# Patient Record
Sex: MALE | Race: WHITE | NOT HISPANIC OR LATINO | URBAN - METROPOLITAN AREA
[De-identification: names, ages, dates, MRNs, and addresses within clinical notes are randomized per-mention and may not be internally consistent; named-entity substitution may affect disease eponyms.]

---

## 2017-01-29 ENCOUNTER — EMERGENCY (EMERGENCY)
Facility: HOSPITAL | Age: 72
LOS: 1 days | Discharge: HOME | End: 2017-01-29
Attending: EMERGENCY MEDICINE
Payer: MEDICARE

## 2017-01-29 VITALS
OXYGEN SATURATION: 98 % | HEART RATE: 49 BPM | SYSTOLIC BLOOD PRESSURE: 158 MMHG | TEMPERATURE: 97 F | RESPIRATION RATE: 18 BRPM | DIASTOLIC BLOOD PRESSURE: 80 MMHG

## 2017-01-29 VITALS
TEMPERATURE: 98 F | HEART RATE: 58 BPM | DIASTOLIC BLOOD PRESSURE: 66 MMHG | SYSTOLIC BLOOD PRESSURE: 126 MMHG | HEIGHT: 66 IN | OXYGEN SATURATION: 96 % | WEIGHT: 175.93 LBS | RESPIRATION RATE: 16 BRPM

## 2017-01-29 DIAGNOSIS — E78.00 PURE HYPERCHOLESTEROLEMIA, UNSPECIFIED: ICD-10-CM

## 2017-01-29 DIAGNOSIS — R51 HEADACHE: ICD-10-CM

## 2017-01-29 DIAGNOSIS — M25.512 PAIN IN LEFT SHOULDER: ICD-10-CM

## 2017-01-29 DIAGNOSIS — I10 ESSENTIAL (PRIMARY) HYPERTENSION: ICD-10-CM

## 2017-01-29 PROCEDURE — 99284 EMERGENCY DEPT VISIT MOD MDM: CPT

## 2017-01-29 PROCEDURE — 70450 CT HEAD/BRAIN W/O DYE: CPT

## 2017-01-29 PROCEDURE — 73030 X-RAY EXAM OF SHOULDER: CPT | Mod: 26,LT

## 2017-01-29 PROCEDURE — 99284 EMERGENCY DEPT VISIT MOD MDM: CPT | Mod: 25

## 2017-01-29 PROCEDURE — 96374 THER/PROPH/DIAG INJ IV PUSH: CPT

## 2017-01-29 PROCEDURE — 70450 CT HEAD/BRAIN W/O DYE: CPT | Mod: 26

## 2017-01-29 PROCEDURE — 73030 X-RAY EXAM OF SHOULDER: CPT

## 2017-01-29 RX ORDER — ACETAMINOPHEN 500 MG
975 TABLET ORAL ONCE
Qty: 0 | Refills: 0 | Status: COMPLETED | OUTPATIENT
Start: 2017-01-29 | End: 2017-01-29

## 2017-01-29 RX ORDER — METOCLOPRAMIDE HCL 10 MG
10 TABLET ORAL ONCE
Qty: 0 | Refills: 0 | Status: COMPLETED | OUTPATIENT
Start: 2017-01-29 | End: 2017-01-29

## 2017-01-29 RX ADMIN — Medication 10 MILLIGRAM(S): at 17:28

## 2017-01-29 RX ADMIN — Medication 975 MILLIGRAM(S): at 17:28

## 2017-01-29 NOTE — ED PROVIDER NOTE - MEDICAL DECISION MAKING DETAILS
72 y/o M pt p/w headache, L shoulder ecchymosis. Check CT Head, Xray L Shoulder. Give IV fluids, meds. Reassess. 72 y/o M pt p/w headache, L shoulder ecchymosis x 1 day. Check CT Head, Xray L Shoulder. Give IV fluids, reglan meds. Reassess.

## 2017-01-29 NOTE — ED PROVIDER NOTE - OBJECTIVE STATEMENT
72 y/o M pt w/ PMHx of HTN, HLD and Gout presents to the ED c/o headache x 2-3 days. Pt states that he checked his BP yesterday and found it to be 208 systolic. On exam, pt also reports sudden onset of bruising and pain to L shoulder yesterday. Pt notes that he had a mechanical fall x 4 months ago, but denies any new trauma or fall. Pt denies fever, chills, chest pain, SOB, cough, nausea, vomiting, back pain, neck pain, numbness, tingling, weakness, dizziness, visual changes, or any other complaints. NKDA

## 2017-01-29 NOTE — ED ADULT TRIAGE NOTE - CHIEF COMPLAINT QUOTE
headache, elevated blood pressure, ecchymosis and deformity to left shoulder with tenderness s/p fall x 4 months ago

## 2017-01-29 NOTE — ED PROVIDER NOTE - NS ED MD SCRIBE ATTENDING SCRIBE SECTIONS
PHYSICAL EXAM/VITAL SIGNS( Pullset)/DISPOSITION/PAST MEDICAL/SURGICAL/SOCIAL HISTORY/HISTORY OF PRESENT ILLNESS/REVIEW OF SYSTEMS

## 2017-01-29 NOTE — ED PROVIDER NOTE - CHPI ED SYMPTOMS NEG
no dizziness/no weakness/no fever/no vomiting/no chest pain, no shortness of breath, no cough/no loss of consciousness/no nausea/no numbness

## 2017-03-01 ENCOUNTER — APPOINTMENT (OUTPATIENT)
Dept: ORTHOPEDIC SURGERY | Facility: CLINIC | Age: 72
End: 2017-03-01

## 2017-03-01 VITALS
WEIGHT: 175 LBS | DIASTOLIC BLOOD PRESSURE: 78 MMHG | BODY MASS INDEX: 29.88 KG/M2 | SYSTOLIC BLOOD PRESSURE: 148 MMHG | HEART RATE: 72 BPM | HEIGHT: 64 IN

## 2017-03-01 DIAGNOSIS — Z86.79 PERSONAL HISTORY OF OTHER DISEASES OF THE CIRCULATORY SYSTEM: ICD-10-CM

## 2017-03-01 DIAGNOSIS — Z86.39 PERSONAL HISTORY OF OTHER ENDOCRINE, NUTRITIONAL AND METABOLIC DISEASE: ICD-10-CM

## 2017-03-01 DIAGNOSIS — Z87.39 PERSONAL HISTORY OF OTHER DISEASES OF THE MUSCULOSKELETAL SYSTEM AND CONNECTIVE TISSUE: ICD-10-CM

## 2017-03-01 RX ORDER — NABUMETONE 500 MG/1
500 TABLET, FILM COATED ORAL
Qty: 60 | Refills: 2 | Status: ACTIVE | COMMUNITY
Start: 2017-03-01 | End: 1900-01-01

## 2017-03-23 ENCOUNTER — APPOINTMENT (OUTPATIENT)
Dept: ORTHOPEDIC SURGERY | Facility: CLINIC | Age: 72
End: 2017-03-23

## 2017-03-23 DIAGNOSIS — M25.512 PAIN IN LEFT SHOULDER: ICD-10-CM

## 2017-03-23 DIAGNOSIS — G89.29 PAIN IN LEFT SHOULDER: ICD-10-CM

## 2017-07-10 ENCOUNTER — RX RENEWAL (OUTPATIENT)
Age: 72
End: 2017-07-10

## 2017-07-10 RX ORDER — TAMSULOSIN HYDROCHLORIDE 0.4 MG/1
0.4 CAPSULE ORAL
Qty: 90 | Refills: 2 | Status: ACTIVE | COMMUNITY
Start: 2017-07-10 | End: 1900-01-01

## 2017-10-13 ENCOUNTER — EMERGENCY (EMERGENCY)
Facility: HOSPITAL | Age: 72
LOS: 1 days | Discharge: HOME | End: 2017-10-13
Attending: EMERGENCY MEDICINE
Payer: MEDICARE

## 2017-10-13 VITALS
WEIGHT: 169.98 LBS | HEART RATE: 54 BPM | OXYGEN SATURATION: 100 % | HEIGHT: 66 IN | SYSTOLIC BLOOD PRESSURE: 139 MMHG | DIASTOLIC BLOOD PRESSURE: 66 MMHG | RESPIRATION RATE: 20 BRPM | TEMPERATURE: 98 F

## 2017-10-13 VITALS
TEMPERATURE: 98 F | SYSTOLIC BLOOD PRESSURE: 170 MMHG | RESPIRATION RATE: 18 BRPM | HEART RATE: 53 BPM | DIASTOLIC BLOOD PRESSURE: 77 MMHG | OXYGEN SATURATION: 100 %

## 2017-10-13 DIAGNOSIS — R10.9 UNSPECIFIED ABDOMINAL PAIN: ICD-10-CM

## 2017-10-13 LAB
ALBUMIN SERPL ELPH-MCNC: 3.7 G/DL — SIGNIFICANT CHANGE UP (ref 3.5–5)
ALP SERPL-CCNC: 74 U/L — SIGNIFICANT CHANGE UP (ref 40–120)
ALT FLD-CCNC: 34 U/L DA — SIGNIFICANT CHANGE UP (ref 10–60)
ANION GAP SERPL CALC-SCNC: 6 MMOL/L — SIGNIFICANT CHANGE UP (ref 5–17)
APPEARANCE UR: CLEAR — SIGNIFICANT CHANGE UP
APTT BLD: 32.5 SEC — SIGNIFICANT CHANGE UP (ref 27.5–37.4)
AST SERPL-CCNC: 32 U/L — SIGNIFICANT CHANGE UP (ref 10–40)
BASOPHILS # BLD AUTO: 0.1 K/UL — SIGNIFICANT CHANGE UP (ref 0–0.2)
BASOPHILS NFR BLD AUTO: 1.1 % — SIGNIFICANT CHANGE UP (ref 0–2)
BILIRUB SERPL-MCNC: 0.5 MG/DL — SIGNIFICANT CHANGE UP (ref 0.2–1.2)
BILIRUB UR-MCNC: NEGATIVE — SIGNIFICANT CHANGE UP
BUN SERPL-MCNC: 35 MG/DL — HIGH (ref 7–18)
CALCIUM SERPL-MCNC: 9.1 MG/DL — SIGNIFICANT CHANGE UP (ref 8.4–10.5)
CHLORIDE SERPL-SCNC: 107 MMOL/L — SIGNIFICANT CHANGE UP (ref 96–108)
CO2 SERPL-SCNC: 27 MMOL/L — SIGNIFICANT CHANGE UP (ref 22–31)
COLOR SPEC: YELLOW — SIGNIFICANT CHANGE UP
CREAT SERPL-MCNC: 1.49 MG/DL — HIGH (ref 0.5–1.3)
DIFF PNL FLD: NEGATIVE — SIGNIFICANT CHANGE UP
EOSINOPHIL # BLD AUTO: 0.1 K/UL — SIGNIFICANT CHANGE UP (ref 0–0.5)
EOSINOPHIL NFR BLD AUTO: 1.7 % — SIGNIFICANT CHANGE UP (ref 0–6)
GLUCOSE SERPL-MCNC: 119 MG/DL — HIGH (ref 70–99)
GLUCOSE UR QL: NEGATIVE — SIGNIFICANT CHANGE UP
HCT VFR BLD CALC: 39.1 % — SIGNIFICANT CHANGE UP (ref 39–50)
HGB BLD-MCNC: 13.7 G/DL — SIGNIFICANT CHANGE UP (ref 13–17)
INR BLD: 1.2 RATIO — HIGH (ref 0.88–1.16)
KETONES UR-MCNC: NEGATIVE — SIGNIFICANT CHANGE UP
LEUKOCYTE ESTERASE UR-ACNC: NEGATIVE — SIGNIFICANT CHANGE UP
LYMPHOCYTES # BLD AUTO: 1.4 K/UL — SIGNIFICANT CHANGE UP (ref 1–3.3)
LYMPHOCYTES # BLD AUTO: 23.7 % — SIGNIFICANT CHANGE UP (ref 13–44)
MCHC RBC-ENTMCNC: 31.5 PG — SIGNIFICANT CHANGE UP (ref 27–34)
MCHC RBC-ENTMCNC: 35 GM/DL — SIGNIFICANT CHANGE UP (ref 32–36)
MCV RBC AUTO: 90.2 FL — SIGNIFICANT CHANGE UP (ref 80–100)
MONOCYTES # BLD AUTO: 0.5 K/UL — SIGNIFICANT CHANGE UP (ref 0–0.9)
MONOCYTES NFR BLD AUTO: 8 % — SIGNIFICANT CHANGE UP (ref 2–14)
NEUTROPHILS # BLD AUTO: 3.8 K/UL — SIGNIFICANT CHANGE UP (ref 1.8–7.4)
NEUTROPHILS NFR BLD AUTO: 65.4 % — SIGNIFICANT CHANGE UP (ref 43–77)
NITRITE UR-MCNC: NEGATIVE — SIGNIFICANT CHANGE UP
PH UR: 6 — SIGNIFICANT CHANGE UP (ref 5–8)
PLATELET # BLD AUTO: 93 K/UL — LOW (ref 150–400)
POTASSIUM SERPL-MCNC: 4.1 MMOL/L — SIGNIFICANT CHANGE UP (ref 3.5–5.3)
POTASSIUM SERPL-SCNC: 4.1 MMOL/L — SIGNIFICANT CHANGE UP (ref 3.5–5.3)
PROT SERPL-MCNC: 6.7 G/DL — SIGNIFICANT CHANGE UP (ref 6–8.3)
PROT UR-MCNC: NEGATIVE — SIGNIFICANT CHANGE UP
PROTHROM AB SERPL-ACNC: 13.1 SEC — HIGH (ref 9.8–12.7)
RBC # BLD: 4.34 M/UL — SIGNIFICANT CHANGE UP (ref 4.2–5.8)
RBC # FLD: 12 % — SIGNIFICANT CHANGE UP (ref 10.3–14.5)
SODIUM SERPL-SCNC: 140 MMOL/L — SIGNIFICANT CHANGE UP (ref 135–145)
SP GR SPEC: 1.01 — SIGNIFICANT CHANGE UP (ref 1.01–1.02)
UROBILINOGEN FLD QL: NEGATIVE — SIGNIFICANT CHANGE UP
WBC # BLD: 5.8 K/UL — SIGNIFICANT CHANGE UP (ref 3.8–10.5)
WBC # FLD AUTO: 5.8 K/UL — SIGNIFICANT CHANGE UP (ref 3.8–10.5)

## 2017-10-13 PROCEDURE — 81003 URINALYSIS AUTO W/O SCOPE: CPT

## 2017-10-13 PROCEDURE — 85027 COMPLETE CBC AUTOMATED: CPT

## 2017-10-13 PROCEDURE — 74177 CT ABD & PELVIS W/CONTRAST: CPT

## 2017-10-13 PROCEDURE — 74177 CT ABD & PELVIS W/CONTRAST: CPT | Mod: 26

## 2017-10-13 PROCEDURE — 85730 THROMBOPLASTIN TIME PARTIAL: CPT

## 2017-10-13 PROCEDURE — 80053 COMPREHEN METABOLIC PANEL: CPT

## 2017-10-13 PROCEDURE — 99285 EMERGENCY DEPT VISIT HI MDM: CPT

## 2017-10-13 PROCEDURE — 99284 EMERGENCY DEPT VISIT MOD MDM: CPT | Mod: 25

## 2017-10-13 PROCEDURE — 85610 PROTHROMBIN TIME: CPT

## 2017-10-13 RX ORDER — SODIUM CHLORIDE 9 MG/ML
1000 INJECTION INTRAMUSCULAR; INTRAVENOUS; SUBCUTANEOUS ONCE
Qty: 0 | Refills: 0 | Status: COMPLETED | OUTPATIENT
Start: 2017-10-13 | End: 2017-10-13

## 2017-10-13 RX ADMIN — SODIUM CHLORIDE 1000 MILLILITER(S): 9 INJECTION INTRAMUSCULAR; INTRAVENOUS; SUBCUTANEOUS at 20:41

## 2017-10-13 NOTE — ED PROVIDER NOTE - MEDICAL DECISION MAKING DETAILS
Will draw labs, CT and reassess. Will draw labs, CT and reassess.  labs and CT unremarkable. patient will followup with surgery clinic to discuss possible need for hernia revision

## 2017-10-13 NOTE — ED PROVIDER NOTE - PHYSICAL EXAMINATION
Subcutaneous palpable nodule- suture like material, likely from hernia repair. no palpable hernia, but site  of the suture material is the site of pain as per patient, nontender on palpation

## 2017-10-13 NOTE — ED PROVIDER NOTE - OBJECTIVE STATEMENT
70 y/o M pt w/ PMHx hernia, gout and HTN presents to the ED c/o RLQ abd. pain. Pain is worse today. Pt reports prior hernia repair 20 years ago. Pt also notes lifting heavy objects at work. Denies fever, chills, N/V/D or any other complaints. NKDA. 70 y/o M pt w/ PMHx hernia, gout and HTN presents to the ED c/o Right lower abdominal pain. Pain is worse today. Pt reports prior hernia repair 20 years ago with mesh. Pt also notes lifting heavy objects at work. pain occurs only when he moves Denies fever, chills, N/V/D or any other complaints. NKDA.

## 2017-10-13 NOTE — ED PROVIDER NOTE - NS_EDPROVIDERDISPOUSERTYPE_ED_A_ED
Pt changed, Abd pad changed around suprapubic catheter, and brief changed. Pt tolerated this well.  Catheter irrigated again, 3 clots came out, along with 40 out of the 60 mL put in Scribe Attestation (For Scribes USE Only)... Scribe Attestation (For Scribes USE Only).../Attending Attestation (For Attendings USE Only)...

## 2017-10-16 DIAGNOSIS — M10.9 GOUT, UNSPECIFIED: ICD-10-CM

## 2017-10-16 DIAGNOSIS — I10 ESSENTIAL (PRIMARY) HYPERTENSION: ICD-10-CM

## 2017-10-16 DIAGNOSIS — E78.00 PURE HYPERCHOLESTEROLEMIA, UNSPECIFIED: ICD-10-CM

## 2023-10-27 PROBLEM — M10.9 GOUT, UNSPECIFIED: Chronic | Status: ACTIVE | Noted: 2017-01-29

## 2023-10-27 PROBLEM — I10 ESSENTIAL (PRIMARY) HYPERTENSION: Chronic | Status: ACTIVE | Noted: 2017-01-29

## 2023-10-27 PROBLEM — E78.00 PURE HYPERCHOLESTEROLEMIA, UNSPECIFIED: Chronic | Status: ACTIVE | Noted: 2017-01-29

## 2023-11-01 VITALS
OXYGEN SATURATION: 95 % | SYSTOLIC BLOOD PRESSURE: 155 MMHG | HEART RATE: 75 BPM | DIASTOLIC BLOOD PRESSURE: 68 MMHG | WEIGHT: 169.98 LBS | RESPIRATION RATE: 18 BRPM | HEIGHT: 63 IN | TEMPERATURE: 97 F

## 2023-11-01 RX ORDER — ATORVASTATIN CALCIUM 80 MG/1
0 TABLET, FILM COATED ORAL
Qty: 0 | Refills: 0 | DISCHARGE

## 2023-11-01 RX ORDER — ALLOPURINOL 300 MG
0 TABLET ORAL
Qty: 0 | Refills: 0 | DISCHARGE

## 2023-11-01 RX ORDER — AMLODIPINE BESYLATE 2.5 MG/1
0 TABLET ORAL
Qty: 0 | Refills: 0 | DISCHARGE

## 2023-11-01 RX ORDER — ASPIRIN/CALCIUM CARB/MAGNESIUM 324 MG
325 TABLET ORAL DAILY
Refills: 0 | Status: DISCONTINUED | OUTPATIENT
Start: 2023-11-02 | End: 2023-11-16

## 2023-11-01 RX ORDER — CHLORHEXIDINE GLUCONATE 213 G/1000ML
1 SOLUTION TOPICAL ONCE
Refills: 0 | Status: DISCONTINUED | OUTPATIENT
Start: 2023-11-02 | End: 2023-11-16

## 2023-11-01 RX ORDER — OMEPRAZOLE 10 MG/1
0 CAPSULE, DELAYED RELEASE ORAL
Qty: 0 | Refills: 0 | DISCHARGE

## 2023-11-01 RX ORDER — NEBIVOLOL HYDROCHLORIDE 5 MG/1
0 TABLET ORAL
Qty: 0 | Refills: 0 | DISCHARGE

## 2023-11-01 NOTE — H&P ADULT - ASSESSMENT
78yo F PMHx HTN, HLD, carotid stenosis, gout who presented to Dr. Willson with SOB, chest pain and fatigue that began two weeks ago described as moderate, occurring intermittently and located at the left side of the chest radiating to the arm, associated w/ headaches, exacerbated by exertion and relieved with rest. Patient currently denies chest pain, shortness of breath, abdominal pain, fatigue, syncope, dizziness, leg pain, leg swelling and N/V/D. Nuclear Stress (10/13/23): normal LV contractility, prob ischemia anterior segment. In light of pts risk factors, CCS class III anginal symptoms and abnormal NST, pt now presents to [facility] for recommended cardiac catheterization with possible intervention if clinically indicated.     -Pt H+H, platelets stable per ISTAT, Pt without any reports of BRBPR, hematuria, prior ICH, melena and no recent or previous GI bleed.   -Loaded with: [ ]81mg ASA, [ ]75mg Plavix,  [x ] ASA 325mg [x ] Plavix 600mg, [ ] No Load [ ]. due to...  -Pt Cr. stable- and LVEF unknown, pre cath fluids ordered per protocol [x]250ml IV bolus over 30 min, [ x] 75cc x2hrs, [ ] 50cc x2hrs, Patient euvolemic on exam.   -Malampatti II  -ASA III    Pt is a Candidate for Moderate Sedation, yes     was used for language [place] ID [id number]    Risks & benefits of procedure and alternative therapy have been explained to the patient including but not limited to: allergic reaction, bleeding w/possible need for blood transfusion, infection, renal and vascular compromise, limb damage, arrhythmia, stroke, vessel dissection/perforation, Myocardial infarction, emergent CABG. Informed consent obtained and in chart   76yo F PMHx HTN, HLD, carotid stenosis, gout who presented to Dr. Willson with SOB, chest pain and fatigue that began two weeks ago described as moderate, occurring intermittently and located at the left side of the chest radiating to the arm, associated w/ headaches, exacerbated by exertion and relieved with rest. Patient currently denies chest pain, shortness of breath, abdominal pain, fatigue, syncope, dizziness, leg pain, leg swelling and N/V/D. Nuclear Stress (10/13/23): normal LV contractility, prob ischemia anterior segment. In light of pts risk factors, CCS class III anginal symptoms and abnormal NST, pt now presents to [facility] for recommended cardiac catheterization with possible intervention if clinically indicated.     -Pt H+H, platelets stable per ISTAT, Pt without any reports of BRBPR, hematuria, prior ICH, melena and no recent or previous GI bleed.   -Loaded with: [ ]81mg ASA, [ ]75mg Plavix,  [x ] ASA 325mg [x ] Plavix 600mg, [ ] No Load [ ]. due to...  -Pt Cr. stable- and LVEF unknown, pre cath fluids ordered per protocol [x]250ml IV bolus over 30 min, [ x] 75cc x2hrs, [ ] 50cc x2hrs, Patient euvolemic on exam.   -Malampatti II  -ASA III    Pt is a Candidate for Moderate Sedation, yes     was used for language Bengali ID [id number]    Risks & benefits of procedure and alternative therapy have been explained to the patient including but not limited to: allergic reaction, bleeding w/possible need for blood transfusion, infection, renal and vascular compromise, limb damage, arrhythmia, stroke, vessel dissection/perforation, Myocardial infarction, emergent CABG. Informed consent obtained and in chart   76yo F PMHx HTN, HLD, carotid stenosis, gout who presented to Dr. Willson with SOB, chest pain and fatigue that began two weeks ago described as moderate, occurring intermittently and located at the left side of the chest radiating to the arm, associated w/ headaches, exacerbated by exertion and relieved with rest. Patient currently denies chest pain, shortness of breath, abdominal pain, fatigue, syncope, dizziness, leg pain, leg swelling and N/V/D. Nuclear Stress (10/13/23): normal LV contractility, prob ischemia anterior segment. In light of pts risk factors, CCS class III anginal symptoms and abnormal NST, pt now presents to [facility] for recommended cardiac catheterization with possible intervention if clinically indicated.     -Pt H+H, platelets stable per ISTAT, Pt without any reports of BRBPR, hematuria, prior ICH, melena and no recent or previous GI bleed.   -Loaded with: [ ]81mg ASA, [ ]75mg Plavix,  [x ] ASA 325mg [x ] Plavix 600mg, [ ] No Load [ ]. due to...  -Pt Cr. stable- and LVEF unknown, pre cath fluids ordered per protocol [x]250ml IV bolus over 30 min, [ x] 75cc x2hrs, [ ] 50cc x2hrs, Patient euvolemic on exam.   -Malampatti II  -ASA III    Pt is a Candidate for Moderate Sedation, yes     was used for language Portuguese ID [id number]    Risks & benefits of procedure and alternative therapy have been explained to the patient including but not limited to: allergic reaction, bleeding w/possible need for blood transfusion, infection, renal and vascular compromise, limb damage, arrhythmia, stroke, vessel dissection/perforation, Myocardial infarction, emergent CABG. Informed consent obtained and in chart   78yo F PMHx HTN, HLD, carotid stenosis, gout who presented to Dr. Willson with SOB, chest pain and fatigue that began two weeks ago described as moderate, occurring intermittently and located at the left side of the chest radiating to the arm, associated w/ headaches, exacerbated by exertion and relieved with rest. Patient currently denies chest pain, shortness of breath, abdominal pain, fatigue, syncope, dizziness, leg pain, leg swelling and N/V/D. Nuclear Stress (10/13/23): normal LV contractility, prob ischemia anterior segment. In light of pts risk factors, CCS class III anginal symptoms and abnormal NST, pt now presents to [facility] for recommended cardiac catheterization with possible intervention if clinically indicated.     **OF NOTE, Pt unaware of medications names or doses*** took all his meds yesterday and none this morning otherwise.  ** Also, reported hx of "bleeding stomach ulcer 30 yrs ago from aspirin and hasn't taken aspirin since then. Discussed w/ MD, okay to proceed with DAPT load and pt is agreeable**    -Pt H+H, platelets stable per ISTAT, Pt without any reports of BRBPR, hematuria, prior ICH, melena and no recent or previous GI bleed.   -Loaded with: [ ]81mg ASA, [ ]75mg Plavix,  [x ] ASA 325mg [x ] Plavix 600mg, [ ] No Load [ ]. due to...  -Pt Cr. stable- and LVEF unknown, pre cath fluids ordered per protocol [x]250ml IV bolus over 30 min, [ x] 75cc x2hrs, [ ] 50cc x2hrs, Patient euvolemic on exam.   -Malampatti II  -ASA III    Pt is a Candidate for Moderate Sedation, yes     was used for language [place] ID [id number]    Risks & benefits of procedure and alternative therapy have been explained to the patient including but not limited to: allergic reaction, bleeding w/possible need for blood transfusion, infection, renal and vascular compromise, limb damage, arrhythmia, stroke, vessel dissection/perforation, Myocardial infarction, emergent CABG. Informed consent obtained and in chart   78yo F PMHx HTN, HLD, carotid stenosis, gout who presented to Dr. Willson with SOB, chest pain and fatigue that began two weeks ago described as moderate, occurring intermittently and located at the left side of the chest radiating to the arm, associated w/ headaches, exacerbated by exertion and relieved with rest. Patient currently denies chest pain, shortness of breath, abdominal pain, fatigue, syncope, dizziness, leg pain, leg swelling and N/V/D. Nuclear Stress (10/13/23): normal LV contractility, prob ischemia anterior segment. In light of pts risk factors, CCS class III anginal symptoms and abnormal NST, pt now presents to [facility] for recommended cardiac catheterization with possible intervention if clinically indicated.     **OF NOTE, Pt unaware of medications names or doses*** took all his meds yesterday and none this morning otherwise.  ** Also, reported hx of "bleeding stomach ulcer 30 yrs ago from aspirin and hasn't taken aspirin since then. Discussed w/ MD, okay to proceed with DAPT load and pt is agreeable**    -Pt H+H, platelets stable per ISTAT, Pt without any reports of BRBPR, hematuria, prior ICH, melena and no recent or previous GI bleed.   -Loaded with: [ ]81mg ASA, [ ]75mg Plavix,  [x ] ASA 325mg [x ] Plavix 600mg, [ ] No Load [ ]. due to...  -Pt Cr. stable- and LVEF unknown, pre cath fluids ordered per protocol [x]250ml IV bolus over 30 min, [ x] 75cc x2hrs, [ ] 50cc x2hrs, Patient euvolemic on exam.   -Malampatti II  -ASA III    Pt is a Candidate for Moderate Sedation, yes     was used for language Macedonian ID [id number]    Risks & benefits of procedure and alternative therapy have been explained to the patient including but not limited to: allergic reaction, bleeding w/possible need for blood transfusion, infection, renal and vascular compromise, limb damage, arrhythmia, stroke, vessel dissection/perforation, Myocardial infarction, emergent CABG. Informed consent obtained and in chart   76yo F PMHx HTN, HLD, carotid stenosis, gout who presented to Dr. Willson with SOB, chest pain and fatigue that began two weeks ago described as moderate, occurring intermittently and located at the left side of the chest radiating to the arm, associated w/ headaches, exacerbated by exertion and relieved with rest. Patient currently denies chest pain, shortness of breath, abdominal pain, fatigue, syncope, dizziness, leg pain, leg swelling and N/V/D. Nuclear Stress (10/13/23): normal LV contractility, prob ischemia anterior segment. In light of pts risk factors, CCS class III anginal symptoms and abnormal NST, pt now presents to [facility] for recommended cardiac catheterization with possible intervention if clinically indicated.     **OF NOTE, Pt unaware of medications names or doses*** took all his meds yesterday and none this morning otherwise.  ** Also, reported hx of "bleeding stomach ulcer 30 yrs ago from aspirin and hasn't taken aspirin since then. Discussed w/ MD, okay to proceed with DAPT load and pt is agreeable**    -Pt H+H, platelets stable per ISTAT, Pt without any reports of BRBPR, hematuria, prior ICH, melena and no recent or previous GI bleed.   -Loaded with: [ ]81mg ASA, [ ]75mg Plavix,  [x ] ASA 325mg [x ] Plavix 600mg, [ ] No Load [ ]. due to...  -Pt Cr. stable- and LVEF unknown, pre cath fluids ordered per protocol [x]250ml IV bolus over 30 min, [ x] 75cc x2hrs, [ ] 50cc x2hrs, Patient euvolemic on exam.   -Malampatti II  -ASA III    Pt is a Candidate for Moderate Sedation, yes     was used for language Luxembourgish ID [id number]    Risks & benefits of procedure and alternative therapy have been explained to the patient including but not limited to: allergic reaction, bleeding w/possible need for blood transfusion, infection, renal and vascular compromise, limb damage, arrhythmia, stroke, vessel dissection/perforation, Myocardial infarction, emergent CABG. Informed consent obtained and in chart   78yo F PMHx HTN, HLD, carotid stenosis, gout who presented to Dr. Willson with SOB, chest pain and fatigue that began two weeks ago described as moderate, occurring intermittently and located at the left side of the chest radiating to the arm, associated w/ headaches, exacerbated by exertion and relieved with rest. Patient currently denies chest pain, shortness of breath, abdominal pain, fatigue, syncope, dizziness, leg pain, leg swelling and N/V/D. Nuclear Stress (10/13/23): normal LV contractility, prob ischemia anterior segment. In light of pts risk factors, CCS class III anginal symptoms and abnormal NST, pt now presents to [facility] for recommended cardiac catheterization with possible intervention if clinically indicated.     **OF NOTE, Pt unaware of medications names or doses*** took all his meds yesterday and none this morning otherwise.  ** Also, reported hx of "bleeding stomach ulcer 30 yrs ago from aspirin and hasn't taken aspirin since then. Discussed w/ MD, okay to proceed with DAPT load and pt is agreeable**    Full labs resulted with K .3.8 and Mag 1.4  ---> ordered replacements, pt already in cath lab. MD made aware.     -Pt H+H, platelets stable per ISTAT, Pt without any reports of BRBPR, hematuria, prior ICH, melena and no recent or previous GI bleed.   -Loaded with: [ ]81mg ASA, [ ]75mg Plavix,  [x ] ASA 325mg [x ] Plavix 600mg, [ ] No Load [ ]. due to...  -Pt Cr. stable- and LVEF unknown, pre cath fluids ordered per protocol [x]250ml IV bolus over 30 min, [ x] 75cc x2hrs, [ ] 50cc x2hrs, Patient euvolemic on exam.   -Malampatti II  -ASA III    Pt is a Candidate for Moderate Sedation, yes     was used for language [place] ID [id number]    Risks & benefits of procedure and alternative therapy have been explained to the patient including but not limited to: allergic reaction, bleeding w/possible need for blood transfusion, infection, renal and vascular compromise, limb damage, arrhythmia, stroke, vessel dissection/perforation, Myocardial infarction, emergent CABG. Informed consent obtained and in chart   76yo F PMHx HTN, HLD, carotid stenosis, gout who presented to Dr. Willson with SOB, chest pain and fatigue that began two weeks ago described as moderate, occurring intermittently and located at the left side of the chest radiating to the arm, associated w/ headaches, exacerbated by exertion and relieved with rest. Patient currently denies chest pain, shortness of breath, abdominal pain, fatigue, syncope, dizziness, leg pain, leg swelling and N/V/D. Nuclear Stress (10/13/23): normal LV contractility, prob ischemia anterior segment. In light of pts risk factors, CCS class III anginal symptoms and abnormal NST, pt now presents to [facility] for recommended cardiac catheterization with possible intervention if clinically indicated.     **OF NOTE, Pt unaware of medications names or doses*** took all his meds yesterday and none this morning otherwise.  ** Also, reported hx of "bleeding stomach ulcer 30 yrs ago from aspirin and hasn't taken aspirin since then. Discussed w/ MD, okay to proceed with DAPT load and pt is agreeable**    Full labs resulted with K .3.8 and Mag 1.4  ---> ordered replacements, pt already in cath lab. MD made aware.     -Pt H+H, platelets stable per ISTAT, Pt without any reports of BRBPR, hematuria, prior ICH, melena and no recent or previous GI bleed.   -Loaded with: [ ]81mg ASA, [ ]75mg Plavix,  [x ] ASA 325mg [x ] Plavix 600mg, [ ] No Load [ ]. due to...  -Pt Cr. stable- and LVEF unknown, pre cath fluids ordered per protocol [x]250ml IV bolus over 30 min, [ x] 75cc x2hrs, [ ] 50cc x2hrs, Patient euvolemic on exam.   -Malampatti II  -ASA III    Pt is a Candidate for Moderate Sedation, yes     was used for language Welsh ID [id number]    Risks & benefits of procedure and alternative therapy have been explained to the patient including but not limited to: allergic reaction, bleeding w/possible need for blood transfusion, infection, renal and vascular compromise, limb damage, arrhythmia, stroke, vessel dissection/perforation, Myocardial infarction, emergent CABG. Informed consent obtained and in chart   78yo F PMHx HTN, HLD, carotid stenosis, gout who presented to Dr. Willson with SOB, chest pain and fatigue that began two weeks ago described as moderate, occurring intermittently and located at the left side of the chest radiating to the arm, associated w/ headaches, exacerbated by exertion and relieved with rest. Patient currently denies chest pain, shortness of breath, abdominal pain, fatigue, syncope, dizziness, leg pain, leg swelling and N/V/D. Nuclear Stress (10/13/23): normal LV contractility, prob ischemia anterior segment. In light of pts risk factors, CCS class III anginal symptoms and abnormal NST, pt now presents to [facility] for recommended cardiac catheterization with possible intervention if clinically indicated.     **OF NOTE, Pt unaware of medications names or doses*** took all his meds yesterday and none this morning otherwise.  ** Also, reported hx of "bleeding stomach ulcer 30 yrs ago from aspirin and hasn't taken aspirin since then. Discussed w/ MD, okay to proceed with DAPT load and pt is agreeable**    Full labs resulted with K .3.8 and Mag 1.4  ---> ordered replacements, pt already in cath lab. MD made aware.     -Pt H+H, platelets stable per ISTAT, Pt without any reports of BRBPR, hematuria, prior ICH, melena and no recent or previous GI bleed.   -Loaded with: [ ]81mg ASA, [ ]75mg Plavix,  [x ] ASA 325mg [x ] Plavix 600mg, [ ] No Load [ ]. due to...  -Pt Cr. stable- and LVEF unknown, pre cath fluids ordered per protocol [x]250ml IV bolus over 30 min, [ x] 75cc x2hrs, [ ] 50cc x2hrs, Patient euvolemic on exam.   -Malampatti II  -ASA III    Pt is a Candidate for Moderate Sedation, yes     was used for language Hebrew ID [id number]    Risks & benefits of procedure and alternative therapy have been explained to the patient including but not limited to: allergic reaction, bleeding w/possible need for blood transfusion, infection, renal and vascular compromise, limb damage, arrhythmia, stroke, vessel dissection/perforation, Myocardial infarction, emergent CABG. Informed consent obtained and in chart   76yo F PMHx HTN, HLD, carotid stenosis, gout who presented to Dr. Willson with SOB, chest pain and fatigue that began two weeks ago described as moderate, occurring intermittently and located at the left side of the chest radiating to the arm, associated w/ headaches, exacerbated by exertion and relieved with rest. Patient currently denies chest pain, shortness of breath, abdominal pain, fatigue, syncope, dizziness, leg pain, leg swelling and N/V/D. Nuclear Stress (10/13/23): normal LV contractility, prob ischemia anterior segment. In light of pts risk factors, CCS class III anginal symptoms and abnormal NST, pt now presents to [facility] for recommended cardiac catheterization with possible intervention if clinically indicated.     **OF NOTE, Pt unaware of medications names or doses*** took all his meds yesterday and none this morning otherwise.  ** Also, reported hx of "bleeding stomach ulcer 30 yrs ago from aspirin and hasn't taken aspirin since then. Discussed w/ MD, okay to proceed with DAPT load and pt is agreeable**    Full labs resulted with K .3.8 and Mag 1.4  ---> ordered replacements, pt already in cath lab. MD made aware. Also noted Platelet 104, MD/fellow made aware.    -Pt H+H, platelets stable per ISTAT, Pt without any reports of BRBPR, hematuria, prior ICH, melena and no recent or previous GI bleed.   -Loaded with: [ ]81mg ASA, [ ]75mg Plavix,  [x ] ASA 325mg [x ] Plavix 600mg, [ ] No Load [ ]. due to...  -Pt Cr. stable- and LVEF unknown, pre cath fluids ordered per protocol [x]250ml IV bolus over 30 min, [ x] 75cc x2hrs, [ ] 50cc x2hrs, Patient euvolemic on exam.   -Malampatti II  -ASA III    Pt is a Candidate for Moderate Sedation, yes     was used for language Mexican ID [id number]    Risks & benefits of procedure and alternative therapy have been explained to the patient including but not limited to: allergic reaction, bleeding w/possible need for blood transfusion, infection, renal and vascular compromise, limb damage, arrhythmia, stroke, vessel dissection/perforation, Myocardial infarction, emergent CABG. Informed consent obtained and in chart   78yo F PMHx HTN, HLD, carotid stenosis, gout who presented to Dr. Willson with SOB, chest pain and fatigue that began two weeks ago described as moderate, occurring intermittently and located at the left side of the chest radiating to the arm, associated w/ headaches, exacerbated by exertion and relieved with rest. Patient currently denies chest pain, shortness of breath, abdominal pain, fatigue, syncope, dizziness, leg pain, leg swelling and N/V/D. Nuclear Stress (10/13/23): normal LV contractility, prob ischemia anterior segment. In light of pts risk factors, CCS class III anginal symptoms and abnormal NST, pt now presents to [facility] for recommended cardiac catheterization with possible intervention if clinically indicated.     **OF NOTE, Pt unaware of medications names or doses*** took all his meds yesterday and none this morning otherwise.  ** Also, reported hx of "bleeding stomach ulcer 30 yrs ago from aspirin and hasn't taken aspirin since then. Discussed w/ MD, okay to proceed with DAPT load and pt is agreeable**    Full labs resulted with K .3.8 and Mag 1.4  ---> ordered replacements, pt already in cath lab. MD made aware. Also noted Platelet 104, MD/fellow made aware.    -Pt H+H, platelets stable per ISTAT, Pt without any reports of BRBPR, hematuria, prior ICH, melena and no recent or previous GI bleed.   -Loaded with: [ ]81mg ASA, [ ]75mg Plavix,  [x ] ASA 325mg [x ] Plavix 600mg, [ ] No Load [ ]. due to...  -Pt Cr. stable- and LVEF unknown, pre cath fluids ordered per protocol [x]250ml IV bolus over 30 min, [ x] 75cc x2hrs, [ ] 50cc x2hrs, Patient euvolemic on exam.   -Malampatti II  -ASA III    Pt is a Candidate for Moderate Sedation, yes     was used for language British ID [id number]    Risks & benefits of procedure and alternative therapy have been explained to the patient including but not limited to: allergic reaction, bleeding w/possible need for blood transfusion, infection, renal and vascular compromise, limb damage, arrhythmia, stroke, vessel dissection/perforation, Myocardial infarction, emergent CABG. Informed consent obtained and in chart   76yo F PMHx HTN, HLD, carotid stenosis, gout who presented to Dr. Willson with SOB, chest pain and fatigue that began two weeks ago described as moderate, occurring intermittently and located at the left side of the chest radiating to the arm, associated w/ headaches, exacerbated by exertion and relieved with rest. Patient currently denies chest pain, shortness of breath, abdominal pain, fatigue, syncope, dizziness, leg pain, leg swelling and N/V/D. Nuclear Stress (10/13/23): normal LV contractility, prob ischemia anterior segment. In light of pts risk factors, CCS class III anginal symptoms and abnormal NST, pt now presents to [facility] for recommended cardiac catheterization with possible intervention if clinically indicated.     **OF NOTE, Pt unaware of medications names or doses*** took all his meds yesterday and none this morning otherwise.  ** Also, reported hx of "bleeding stomach ulcer 30 yrs ago from aspirin and hasn't taken aspirin since then. Discussed w/ MD, okay to proceed with DAPT load and pt is agreeable**    Full labs resulted with K .3.8 and Mag 1.4  ---> ordered replacements, pt already in cath lab. MD made aware. Also noted Platelet 104, MD/fellow made aware.    -Pt H+H, platelets stable per ISTAT, Pt without any reports of BRBPR, hematuria, prior ICH, melena and no recent or previous GI bleed.   -Loaded with: [ ]81mg ASA, [ ]75mg Plavix,  [x ] ASA 325mg [x ] Plavix 600mg, [ ] No Load [ ]. due to...  -Pt Cr. stable- and LVEF unknown, pre cath fluids ordered per protocol [x]250ml IV bolus over 30 min, [ x] 75cc x2hrs, [ ] 50cc x2hrs, Patient euvolemic on exam.   -Malampatti II  -ASA III    Pt is a Candidate for Moderate Sedation, yes     was used for language Ghanaian ID [id number]    Risks & benefits of procedure and alternative therapy have been explained to the patient including but not limited to: allergic reaction, bleeding w/possible need for blood transfusion, infection, renal and vascular compromise, limb damage, arrhythmia, stroke, vessel dissection/perforation, Myocardial infarction, emergent CABG. Informed consent obtained and in chart

## 2023-11-01 NOTE — H&P ADULT - NSHPLABSRESULTS_GEN_ALL_CORE
15.5   5.25  )-----------( 104      ( 02 Nov 2023 06:56 )             41.9       11-02    139  |  103  |  x   ----------------------------<  x   3.8   |  x   |  x           PT/INR - ( 02 Nov 2023 06:56 )   PT: 12.2 sec;   INR: 1.07          PTT - ( 02 Nov 2023 06:56 )  PTT:33.0 sec              EKG: 15.5   5.25  )-----------( 104      ( 02 Nov 2023 06:56 )             41.9       11-02    139  |  103  |  x   ----------------------------<  x   3.8   |  x   |  x           PT/INR - ( 02 Nov 2023 06:56 )   PT: 12.2 sec;   INR: 1.07          PTT - ( 02 Nov 2023 06:56 )  PTT:33.0 sec              EKG: NSR, LBBB

## 2023-11-01 NOTE — H&P ADULT - HISTORY OF PRESENT ILLNESS
Cardiologist: Dr. Willson  Pharmacy:   Escort:    76yo F PMHx HTN, HLD, carotid stenosis, gout who presented to Dr. Willson with SOB, chest pain and fatigue that began two weeks ago described as moderate, occurring intermittently and located at the left side of the chest radiating to the arm, associated w/ headaches, exacerbated by exertion and relieved with rest. Patient currently denies chest pain, shortness of breath, abdominal pain, fatigue, syncope, dizziness, leg pain, leg swelling and N/V/D.    Nuclear Stress (10/13/23): normal LV contractility, prob ischemia anterior segment.    In light of pts risk factors, CCS class __ anginal symptoms and abnormal NST, pt now presents to [facility] for recommended cardiac catheterization with possible intervention if clinically indicated.  Cardiologist: Dr. Willson  Pharmacy:   Escort:    76yo F PMHx HTN, HLD, carotid stenosis, gout who presented to Dr. Willson with SOB, chest pain and fatigue that began two weeks ago described as moderate, occurring intermittently and located at the left side of the chest radiating to the arm, associated w/ headaches, exacerbated by exertion and relieved with rest. Patient currently denies chest pain, shortness of breath, abdominal pain, fatigue, syncope, dizziness, leg pain, leg swelling and N/V/D.    Nuclear Stress (10/13/23): normal LV contractility, prob ischemia anterior segment.    In light of pts risk factors, CCS class __ anginal symptoms and abnormal NST, pt now presents to [facility] for recommended cardiac catheterization with possible intervention if clinically indicated.  Cardiologist: Dr. Willson  Pharmacy:   Escort:    78yo F PMHx HTN, HLD, carotid stenosis, gout who presented to Dr. Willson with SOB, chest pain and fatigue that began two weeks ago described as moderate, occurring intermittently and located at the left side of the chest radiating to the arm, associated w/ headaches, exacerbated by exertion and relieved with rest. Patient currently denies chest pain, shortness of breath, abdominal pain, fatigue, syncope, dizziness, leg pain, leg swelling and N/V/D.    Nuclear Stress (10/13/23): normal LV contractility, prob ischemia anterior segment.    In light of pts risk factors, CCS class __ anginal symptoms and abnormal NST, pt now presents to [facility] for recommended cardiac catheterization with possible intervention if clinically indicated.  Cardiologist: Dr. Willson  Pharmacy: [facility]  Escort: Son/daughter in law    78yo F PMHx HTN, HLD, carotid stenosis, gout who presented to Dr. Willson with SOB, chest pain and fatigue that began two weeks ago described as moderate, occurring intermittently and located at the left side of the chest radiating to the arm, associated w/ headaches, exacerbated by exertion and relieved with rest. Patient currently denies chest pain, shortness of breath, abdominal pain, fatigue, syncope, dizziness, leg pain, leg swelling and N/V/D.    Nuclear Stress (10/13/23): normal LV contractility, prob ischemia anterior segment.    In light of pts risk factors, CCS class III anginal symptoms and abnormal NST, pt now presents to [facility] for recommended cardiac catheterization with possible intervention if clinically indicated.  Cardiologist: Dr. Willson  Pharmacy: [facility]  Escort: Son/daughter in law    76yo F PMHx HTN, HLD, carotid stenosis, gout who presented to Dr. Willson with SOB, chest pain and fatigue that began two weeks ago described as moderate, occurring intermittently and located at the left side of the chest radiating to the arm, associated w/ headaches, exacerbated by exertion and relieved with rest. Patient currently denies chest pain, shortness of breath, abdominal pain, fatigue, syncope, dizziness, leg pain, leg swelling and N/V/D.    Nuclear Stress (10/13/23): normal LV contractility, prob ischemia anterior segment.    In light of pts risk factors, CCS class III anginal symptoms and abnormal NST, pt now presents to [facility] for recommended cardiac catheterization with possible intervention if clinically indicated.  Cardiologist: Dr. Willson  Pharmacy: [facility]  Escort: Son/daughter in law    76yo F PMHx HTN, HLD, carotid stenosis, gout who presented to Dr. Willson with SOB, chest pain and fatigue that began two weeks ago described as moderate, occurring intermittently and located at the left side of the chest radiating to the arm, associated w/ headaches, exacerbated by exertion and relieved with rest. Patient currently denies chest pain, shortness of breath, abdominal pain, fatigue, syncope, dizziness, leg pain, leg swelling and N/V/D.    Nuclear Stress (10/13/23): normal LV contractility, prob ischemia anterior segment.    In light of pts risk factors, CCS class III anginal symptoms and abnormal NST, pt now presents to [facility] for recommended cardiac catheterization with possible intervention if clinically indicated.

## 2023-11-02 ENCOUNTER — OUTPATIENT (OUTPATIENT)
Dept: OUTPATIENT SERVICES | Facility: HOSPITAL | Age: 78
LOS: 1 days | Discharge: HOME | End: 2023-11-02
Payer: MEDICARE

## 2023-11-02 LAB
A1C WITH ESTIMATED AVERAGE GLUCOSE RESULT: 5.9 % — HIGH (ref 4–5.6)
A1C WITH ESTIMATED AVERAGE GLUCOSE RESULT: 5.9 % — HIGH (ref 4–5.6)
ALBUMIN SERPL ELPH-MCNC: 4.5 G/DL — SIGNIFICANT CHANGE UP (ref 3.3–5)
ALBUMIN SERPL ELPH-MCNC: 4.5 G/DL — SIGNIFICANT CHANGE UP (ref 3.3–5)
ALP SERPL-CCNC: 92 U/L — SIGNIFICANT CHANGE UP (ref 40–120)
ALP SERPL-CCNC: 92 U/L — SIGNIFICANT CHANGE UP (ref 40–120)
ALT FLD-CCNC: 17 U/L — SIGNIFICANT CHANGE UP (ref 10–45)
ALT FLD-CCNC: 17 U/L — SIGNIFICANT CHANGE UP (ref 10–45)
ANION GAP SERPL CALC-SCNC: 10 MMOL/L — SIGNIFICANT CHANGE UP (ref 5–17)
ANION GAP SERPL CALC-SCNC: 10 MMOL/L — SIGNIFICANT CHANGE UP (ref 5–17)
APTT BLD: 33 SEC — SIGNIFICANT CHANGE UP (ref 24.5–35.6)
APTT BLD: 33 SEC — SIGNIFICANT CHANGE UP (ref 24.5–35.6)
AST SERPL-CCNC: 19 U/L — SIGNIFICANT CHANGE UP (ref 10–40)
AST SERPL-CCNC: 19 U/L — SIGNIFICANT CHANGE UP (ref 10–40)
BASE EXCESS BLDV CALC-SCNC: 2.4 MMOL/L — SIGNIFICANT CHANGE UP (ref -2–3)
BASE EXCESS BLDV CALC-SCNC: 2.4 MMOL/L — SIGNIFICANT CHANGE UP (ref -2–3)
BASOPHILS # BLD AUTO: 0.03 K/UL — SIGNIFICANT CHANGE UP (ref 0–0.2)
BASOPHILS # BLD AUTO: 0.03 K/UL — SIGNIFICANT CHANGE UP (ref 0–0.2)
BASOPHILS NFR BLD AUTO: 0.6 % — SIGNIFICANT CHANGE UP (ref 0–2)
BASOPHILS NFR BLD AUTO: 0.6 % — SIGNIFICANT CHANGE UP (ref 0–2)
BILIRUB SERPL-MCNC: 0.8 MG/DL — SIGNIFICANT CHANGE UP (ref 0.2–1.2)
BILIRUB SERPL-MCNC: 0.8 MG/DL — SIGNIFICANT CHANGE UP (ref 0.2–1.2)
BUN SERPL-MCNC: 27 MG/DL — HIGH (ref 7–23)
BUN SERPL-MCNC: 27 MG/DL — HIGH (ref 7–23)
CA-I SERPL-SCNC: 1.31 MMOL/L — SIGNIFICANT CHANGE UP (ref 1.15–1.33)
CA-I SERPL-SCNC: 1.31 MMOL/L — SIGNIFICANT CHANGE UP (ref 1.15–1.33)
CALCIUM SERPL-MCNC: 10 MG/DL — SIGNIFICANT CHANGE UP (ref 8.4–10.5)
CALCIUM SERPL-MCNC: 10 MG/DL — SIGNIFICANT CHANGE UP (ref 8.4–10.5)
CHLORIDE SERPL-SCNC: 103 MMOL/L — SIGNIFICANT CHANGE UP (ref 96–108)
CHLORIDE SERPL-SCNC: 103 MMOL/L — SIGNIFICANT CHANGE UP (ref 96–108)
CHOLEST SERPL-MCNC: 150 MG/DL — SIGNIFICANT CHANGE UP
CHOLEST SERPL-MCNC: 150 MG/DL — SIGNIFICANT CHANGE UP
CO2 BLDV-SCNC: 29.3 MMOL/L — HIGH (ref 22–26)
CO2 BLDV-SCNC: 29.3 MMOL/L — HIGH (ref 22–26)
CO2 SERPL-SCNC: 26 MMOL/L — SIGNIFICANT CHANGE UP (ref 22–31)
CO2 SERPL-SCNC: 26 MMOL/L — SIGNIFICANT CHANGE UP (ref 22–31)
COHGB MFR BLDV: 1.3 % — SIGNIFICANT CHANGE UP
COHGB MFR BLDV: 1.3 % — SIGNIFICANT CHANGE UP
CREAT SERPL-MCNC: 1.22 MG/DL — SIGNIFICANT CHANGE UP (ref 0.5–1.3)
CREAT SERPL-MCNC: 1.22 MG/DL — SIGNIFICANT CHANGE UP (ref 0.5–1.3)
EGFR: 60 ML/MIN/1.73M2 — SIGNIFICANT CHANGE UP
EGFR: 60 ML/MIN/1.73M2 — SIGNIFICANT CHANGE UP
EOSINOPHIL # BLD AUTO: 0.09 K/UL — SIGNIFICANT CHANGE UP (ref 0–0.5)
EOSINOPHIL # BLD AUTO: 0.09 K/UL — SIGNIFICANT CHANGE UP (ref 0–0.5)
EOSINOPHIL NFR BLD AUTO: 1.7 % — SIGNIFICANT CHANGE UP (ref 0–6)
EOSINOPHIL NFR BLD AUTO: 1.7 % — SIGNIFICANT CHANGE UP (ref 0–6)
ESTIMATED AVERAGE GLUCOSE: 123 MG/DL — HIGH (ref 68–114)
ESTIMATED AVERAGE GLUCOSE: 123 MG/DL — HIGH (ref 68–114)
GAS PNL BLDV: 137 MMOL/L — SIGNIFICANT CHANGE UP (ref 136–145)
GAS PNL BLDV: 137 MMOL/L — SIGNIFICANT CHANGE UP (ref 136–145)
GLUCOSE BLDV-MCNC: 148 MG/DL — HIGH (ref 70–99)
GLUCOSE BLDV-MCNC: 148 MG/DL — HIGH (ref 70–99)
GLUCOSE SERPL-MCNC: 147 MG/DL — HIGH (ref 70–99)
GLUCOSE SERPL-MCNC: 147 MG/DL — HIGH (ref 70–99)
HCO3 BLDV-SCNC: 28 MMOL/L — SIGNIFICANT CHANGE UP (ref 22–29)
HCO3 BLDV-SCNC: 28 MMOL/L — SIGNIFICANT CHANGE UP (ref 22–29)
HCT VFR BLD CALC: 41.9 % — SIGNIFICANT CHANGE UP (ref 39–50)
HCT VFR BLD CALC: 41.9 % — SIGNIFICANT CHANGE UP (ref 39–50)
HCT VFR BLDA CALC: 47 % — SIGNIFICANT CHANGE UP
HCT VFR BLDA CALC: 47 % — SIGNIFICANT CHANGE UP
HDLC SERPL-MCNC: 39 MG/DL — LOW
HDLC SERPL-MCNC: 39 MG/DL — LOW
HGB BLD CALC-MCNC: 15.5 G/DL — SIGNIFICANT CHANGE UP (ref 12.6–17.4)
HGB BLD CALC-MCNC: 15.5 G/DL — SIGNIFICANT CHANGE UP (ref 12.6–17.4)
HGB BLD-MCNC: 15.5 G/DL — SIGNIFICANT CHANGE UP (ref 13–17)
HGB BLD-MCNC: 15.5 G/DL — SIGNIFICANT CHANGE UP (ref 13–17)
IMM GRANULOCYTES NFR BLD AUTO: 0.2 % — SIGNIFICANT CHANGE UP (ref 0–0.9)
IMM GRANULOCYTES NFR BLD AUTO: 0.2 % — SIGNIFICANT CHANGE UP (ref 0–0.9)
INR BLD: 1.07 — SIGNIFICANT CHANGE UP (ref 0.85–1.18)
INR BLD: 1.07 — SIGNIFICANT CHANGE UP (ref 0.85–1.18)
ISTAT INR: 1.1 — SIGNIFICANT CHANGE UP (ref 0.88–1.16)
ISTAT INR: 1.1 — SIGNIFICANT CHANGE UP (ref 0.88–1.16)
ISTAT PT: 13.3 SEC — HIGH (ref 10–12.9)
ISTAT PT: 13.3 SEC — HIGH (ref 10–12.9)
LIPID PNL WITH DIRECT LDL SERPL: 90 MG/DL — SIGNIFICANT CHANGE UP
LIPID PNL WITH DIRECT LDL SERPL: 90 MG/DL — SIGNIFICANT CHANGE UP
LYMPHOCYTES # BLD AUTO: 1.19 K/UL — SIGNIFICANT CHANGE UP (ref 1–3.3)
LYMPHOCYTES # BLD AUTO: 1.19 K/UL — SIGNIFICANT CHANGE UP (ref 1–3.3)
LYMPHOCYTES # BLD AUTO: 22.7 % — SIGNIFICANT CHANGE UP (ref 13–44)
LYMPHOCYTES # BLD AUTO: 22.7 % — SIGNIFICANT CHANGE UP (ref 13–44)
MAGNESIUM SERPL-MCNC: 1.4 MG/DL — LOW (ref 1.6–2.6)
MAGNESIUM SERPL-MCNC: 1.4 MG/DL — LOW (ref 1.6–2.6)
MCHC RBC-ENTMCNC: 31.8 PG — SIGNIFICANT CHANGE UP (ref 27–34)
MCHC RBC-ENTMCNC: 31.8 PG — SIGNIFICANT CHANGE UP (ref 27–34)
MCHC RBC-ENTMCNC: 37 GM/DL — HIGH (ref 32–36)
MCHC RBC-ENTMCNC: 37 GM/DL — HIGH (ref 32–36)
MCV RBC AUTO: 85.9 FL — SIGNIFICANT CHANGE UP (ref 80–100)
MCV RBC AUTO: 85.9 FL — SIGNIFICANT CHANGE UP (ref 80–100)
METHGB MFR BLDV: 0.2 % — SIGNIFICANT CHANGE UP
METHGB MFR BLDV: 0.2 % — SIGNIFICANT CHANGE UP
MONOCYTES # BLD AUTO: 0.49 K/UL — SIGNIFICANT CHANGE UP (ref 0–0.9)
MONOCYTES # BLD AUTO: 0.49 K/UL — SIGNIFICANT CHANGE UP (ref 0–0.9)
MONOCYTES NFR BLD AUTO: 9.3 % — SIGNIFICANT CHANGE UP (ref 2–14)
MONOCYTES NFR BLD AUTO: 9.3 % — SIGNIFICANT CHANGE UP (ref 2–14)
NEUTROPHILS # BLD AUTO: 3.44 K/UL — SIGNIFICANT CHANGE UP (ref 1.8–7.4)
NEUTROPHILS # BLD AUTO: 3.44 K/UL — SIGNIFICANT CHANGE UP (ref 1.8–7.4)
NEUTROPHILS NFR BLD AUTO: 65.5 % — SIGNIFICANT CHANGE UP (ref 43–77)
NEUTROPHILS NFR BLD AUTO: 65.5 % — SIGNIFICANT CHANGE UP (ref 43–77)
NON HDL CHOLESTEROL: 111 MG/DL — SIGNIFICANT CHANGE UP
NON HDL CHOLESTEROL: 111 MG/DL — SIGNIFICANT CHANGE UP
NRBC # BLD: 0 /100 WBCS — SIGNIFICANT CHANGE UP (ref 0–0)
NRBC # BLD: 0 /100 WBCS — SIGNIFICANT CHANGE UP (ref 0–0)
PCO2 BLDV: 45 MMHG — SIGNIFICANT CHANGE UP (ref 42–55)
PCO2 BLDV: 45 MMHG — SIGNIFICANT CHANGE UP (ref 42–55)
PH BLDV: 7.4 — SIGNIFICANT CHANGE UP (ref 7.32–7.43)
PH BLDV: 7.4 — SIGNIFICANT CHANGE UP (ref 7.32–7.43)
PLATELET # BLD AUTO: 104 K/UL — LOW (ref 150–400)
PLATELET # BLD AUTO: 104 K/UL — LOW (ref 150–400)
PO2 BLDV: 36 MMHG — SIGNIFICANT CHANGE UP (ref 25–45)
PO2 BLDV: 36 MMHG — SIGNIFICANT CHANGE UP (ref 25–45)
POTASSIUM BLDV-SCNC: 4.4 MMOL/L — SIGNIFICANT CHANGE UP (ref 3.5–5.1)
POTASSIUM BLDV-SCNC: 4.4 MMOL/L — SIGNIFICANT CHANGE UP (ref 3.5–5.1)
POTASSIUM SERPL-MCNC: 3.8 MMOL/L — SIGNIFICANT CHANGE UP (ref 3.5–5.3)
POTASSIUM SERPL-MCNC: 3.8 MMOL/L — SIGNIFICANT CHANGE UP (ref 3.5–5.3)
POTASSIUM SERPL-SCNC: 3.8 MMOL/L — SIGNIFICANT CHANGE UP (ref 3.5–5.3)
POTASSIUM SERPL-SCNC: 3.8 MMOL/L — SIGNIFICANT CHANGE UP (ref 3.5–5.3)
PROT SERPL-MCNC: 7 G/DL — SIGNIFICANT CHANGE UP (ref 6–8.3)
PROT SERPL-MCNC: 7 G/DL — SIGNIFICANT CHANGE UP (ref 6–8.3)
PROTHROM AB SERPL-ACNC: 12.2 SEC — SIGNIFICANT CHANGE UP (ref 9.5–13)
PROTHROM AB SERPL-ACNC: 12.2 SEC — SIGNIFICANT CHANGE UP (ref 9.5–13)
RBC # BLD: 4.88 M/UL — SIGNIFICANT CHANGE UP (ref 4.2–5.8)
RBC # BLD: 4.88 M/UL — SIGNIFICANT CHANGE UP (ref 4.2–5.8)
RBC # FLD: 13.2 % — SIGNIFICANT CHANGE UP (ref 10.3–14.5)
RBC # FLD: 13.2 % — SIGNIFICANT CHANGE UP (ref 10.3–14.5)
SAO2 % BLDV: 70 % — SIGNIFICANT CHANGE UP (ref 67–88)
SAO2 % BLDV: 70 % — SIGNIFICANT CHANGE UP (ref 67–88)
SODIUM SERPL-SCNC: 139 MMOL/L — SIGNIFICANT CHANGE UP (ref 135–145)
SODIUM SERPL-SCNC: 139 MMOL/L — SIGNIFICANT CHANGE UP (ref 135–145)
TRIGL SERPL-MCNC: 103 MG/DL — SIGNIFICANT CHANGE UP
TRIGL SERPL-MCNC: 103 MG/DL — SIGNIFICANT CHANGE UP
WBC # BLD: 5.25 K/UL — SIGNIFICANT CHANGE UP (ref 3.8–10.5)
WBC # BLD: 5.25 K/UL — SIGNIFICANT CHANGE UP (ref 3.8–10.5)
WBC # FLD AUTO: 5.25 K/UL — SIGNIFICANT CHANGE UP (ref 3.8–10.5)
WBC # FLD AUTO: 5.25 K/UL — SIGNIFICANT CHANGE UP (ref 3.8–10.5)

## 2023-11-02 PROCEDURE — 36415 COLL VENOUS BLD VENIPUNCTURE: CPT

## 2023-11-02 PROCEDURE — 83036 HEMOGLOBIN GLYCOSYLATED A1C: CPT

## 2023-11-02 PROCEDURE — 85610 PROTHROMBIN TIME: CPT

## 2023-11-02 PROCEDURE — C1887: CPT

## 2023-11-02 PROCEDURE — 80053 COMPREHEN METABOLIC PANEL: CPT

## 2023-11-02 PROCEDURE — 93458 L HRT ARTERY/VENTRICLE ANGIO: CPT

## 2023-11-02 PROCEDURE — C1769: CPT

## 2023-11-02 PROCEDURE — 85025 COMPLETE CBC W/AUTO DIFF WBC: CPT

## 2023-11-02 PROCEDURE — 83735 ASSAY OF MAGNESIUM: CPT

## 2023-11-02 PROCEDURE — 93458 L HRT ARTERY/VENTRICLE ANGIO: CPT | Mod: 26

## 2023-11-02 PROCEDURE — 99152 MOD SED SAME PHYS/QHP 5/>YRS: CPT

## 2023-11-02 PROCEDURE — C1894: CPT

## 2023-11-02 PROCEDURE — 85730 THROMBOPLASTIN TIME PARTIAL: CPT

## 2023-11-02 PROCEDURE — 80061 LIPID PANEL: CPT

## 2023-11-02 PROCEDURE — 82803 BLOOD GASES ANY COMBINATION: CPT

## 2023-11-02 RX ORDER — ATORVASTATIN CALCIUM 80 MG/1
1 TABLET, FILM COATED ORAL
Refills: 0 | DISCHARGE

## 2023-11-02 RX ORDER — SODIUM CHLORIDE 9 MG/ML
500 INJECTION INTRAMUSCULAR; INTRAVENOUS; SUBCUTANEOUS
Refills: 0 | Status: DISCONTINUED | OUTPATIENT
Start: 2023-11-02 | End: 2023-11-16

## 2023-11-02 RX ORDER — CLOPIDOGREL BISULFATE 75 MG/1
600 TABLET, FILM COATED ORAL ONCE
Refills: 0 | Status: COMPLETED | OUTPATIENT
Start: 2023-11-02 | End: 2023-11-02

## 2023-11-02 RX ORDER — COLCHICINE 0.6 MG
1 TABLET ORAL
Refills: 0 | DISCHARGE

## 2023-11-02 RX ORDER — METOPROLOL TARTRATE 50 MG
1 TABLET ORAL
Refills: 0 | DISCHARGE

## 2023-11-02 RX ORDER — OMEPRAZOLE 10 MG/1
1 CAPSULE, DELAYED RELEASE ORAL
Refills: 0 | DISCHARGE

## 2023-11-02 RX ORDER — OXYCODONE AND ACETAMINOPHEN 5; 325 MG/1; MG/1
1 TABLET ORAL
Refills: 0 | DISCHARGE

## 2023-11-02 RX ORDER — MAGNESIUM SULFATE 500 MG/ML
2 VIAL (ML) INJECTION ONCE
Refills: 0 | Status: COMPLETED | OUTPATIENT
Start: 2023-11-02 | End: 2023-11-02

## 2023-11-02 RX ORDER — HYDROCHLOROTHIAZIDE 25 MG
1 TABLET ORAL
Refills: 0 | DISCHARGE

## 2023-11-02 RX ORDER — TELMISARTAN 20 MG/1
1 TABLET ORAL
Refills: 0 | DISCHARGE

## 2023-11-02 RX ORDER — ISOSORBIDE MONONITRATE 60 MG/1
1 TABLET, EXTENDED RELEASE ORAL
Refills: 0 | DISCHARGE

## 2023-11-02 RX ORDER — SODIUM CHLORIDE 9 MG/ML
250 INJECTION INTRAMUSCULAR; INTRAVENOUS; SUBCUTANEOUS ONCE
Refills: 0 | Status: COMPLETED | OUTPATIENT
Start: 2023-11-02 | End: 2023-11-02

## 2023-11-02 RX ORDER — ALLOPURINOL 300 MG
1 TABLET ORAL
Refills: 0 | DISCHARGE

## 2023-11-02 RX ORDER — FINASTERIDE 5 MG/1
1 TABLET, FILM COATED ORAL
Refills: 0 | DISCHARGE

## 2023-11-02 RX ORDER — POTASSIUM CHLORIDE 20 MEQ
40 PACKET (EA) ORAL ONCE
Refills: 0 | Status: COMPLETED | OUTPATIENT
Start: 2023-11-02 | End: 2023-11-02

## 2023-11-02 RX ORDER — FAMOTIDINE 10 MG/ML
1 INJECTION INTRAVENOUS
Refills: 0 | DISCHARGE

## 2023-11-02 RX ORDER — RANOLAZINE 500 MG/1
1 TABLET, FILM COATED, EXTENDED RELEASE ORAL
Refills: 0 | DISCHARGE

## 2023-11-02 RX ORDER — AMLODIPINE BESYLATE 2.5 MG/1
1 TABLET ORAL
Refills: 0 | DISCHARGE

## 2023-11-02 RX ADMIN — CLOPIDOGREL BISULFATE 600 MILLIGRAM(S): 75 TABLET, FILM COATED ORAL at 07:37

## 2023-11-02 RX ADMIN — Medication 25 GRAM(S): at 08:49

## 2023-11-02 RX ADMIN — SODIUM CHLORIDE 600 MILLILITER(S): 9 INJECTION INTRAMUSCULAR; INTRAVENOUS; SUBCUTANEOUS at 07:33

## 2023-11-02 RX ADMIN — SODIUM CHLORIDE 200 MILLILITER(S): 9 INJECTION INTRAMUSCULAR; INTRAVENOUS; SUBCUTANEOUS at 09:51

## 2023-11-02 RX ADMIN — Medication 325 MILLIGRAM(S): at 07:37

## 2023-11-02 RX ADMIN — Medication 40 MILLIEQUIVALENT(S): at 08:49

## 2023-11-02 NOTE — PROGRESS NOTE ADULT - SUBJECTIVE AND OBJECTIVE BOX
Interventional Cardiology PA SDA Discharge Note    Patient without complaints. Ambulated and voided without difficulties    Afebrile, VSS    Ext:    	Right Radial : no   hematoma, no  bleeding, dressing; C/D/I      Pulses:    intact RAD 2+      A/P:  78yo F PMHx HTN, HLD, carotid stenosis, gout who presented to Dr. Willson with SOB, chest pain and fatigue that began two weeks ago described as moderate, occurring intermittently and located at the left side of the chest radiating to the arm, associated w/ headaches, exacerbated by exertion and relieved with rest.  Nuclear Stress (10/13/23): normal LV contractility, prob ischemia anterior segment. In light of pts risk factors, CCS class III anginal symptoms and abnormal NST, pt now presents to [facility] for recommended cardiac catheterization with possible intervention if clinically indicated.     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	Stable for discharge as per attending Dr. Fernandez after bed rest,wrist stable and 30 minutes of ambulation.  2.	Follow-up with PMD/Cardiologist Dr Willson   in 1-2 weeks  3.	Discharged forms signed and copies in chart   
Yes

## 2023-11-13 DIAGNOSIS — R94.39 ABNORMAL RESULT OF OTHER CARDIOVASCULAR FUNCTION STUDY: ICD-10-CM

## 2023-11-13 DIAGNOSIS — I25.118 ATHEROSCLEROTIC HEART DISEASE OF NATIVE CORONARY ARTERY WITH OTHER FORMS OF ANGINA PECTORIS: ICD-10-CM

## 2024-07-05 ENCOUNTER — EMERGENCY (EMERGENCY)
Facility: HOSPITAL | Age: 79
LOS: 1 days | Discharge: HOME | End: 2024-07-05
Attending: STUDENT IN AN ORGANIZED HEALTH CARE EDUCATION/TRAINING PROGRAM
Payer: MEDICARE

## 2024-07-05 VITALS
SYSTOLIC BLOOD PRESSURE: 138 MMHG | TEMPERATURE: 99 F | DIASTOLIC BLOOD PRESSURE: 76 MMHG | OXYGEN SATURATION: 99 % | HEART RATE: 69 BPM | RESPIRATION RATE: 18 BRPM

## 2024-07-05 VITALS
TEMPERATURE: 98 F | WEIGHT: 169.98 LBS | DIASTOLIC BLOOD PRESSURE: 84 MMHG | RESPIRATION RATE: 17 BRPM | SYSTOLIC BLOOD PRESSURE: 151 MMHG | HEART RATE: 78 BPM | OXYGEN SATURATION: 98 %

## 2024-07-05 LAB
APPEARANCE UR: CLEAR — SIGNIFICANT CHANGE UP
BILIRUB UR-MCNC: NEGATIVE — SIGNIFICANT CHANGE UP
COLOR SPEC: YELLOW — SIGNIFICANT CHANGE UP
DIFF PNL FLD: NEGATIVE — SIGNIFICANT CHANGE UP
GLUCOSE UR QL: NEGATIVE MG/DL — SIGNIFICANT CHANGE UP
KETONES UR-MCNC: NEGATIVE MG/DL — SIGNIFICANT CHANGE UP
LEUKOCYTE ESTERASE UR-ACNC: NEGATIVE — SIGNIFICANT CHANGE UP
NITRITE UR-MCNC: NEGATIVE — SIGNIFICANT CHANGE UP
PH UR: 5 — SIGNIFICANT CHANGE UP (ref 5–8)
PROT UR-MCNC: NEGATIVE MG/DL — SIGNIFICANT CHANGE UP
SP GR SPEC: 1.01 — SIGNIFICANT CHANGE UP (ref 1–1.03)
UROBILINOGEN FLD QL: 0.2 MG/DL — SIGNIFICANT CHANGE UP (ref 0.2–1)

## 2024-07-05 PROCEDURE — 72192 CT PELVIS W/O DYE: CPT | Mod: 26,MC

## 2024-07-05 PROCEDURE — 99284 EMERGENCY DEPT VISIT MOD MDM: CPT

## 2024-07-05 PROCEDURE — 76376 3D RENDER W/INTRP POSTPROCES: CPT

## 2024-07-05 PROCEDURE — 73522 X-RAY EXAM HIPS BI 3-4 VIEWS: CPT | Mod: 26

## 2024-07-05 PROCEDURE — 96372 THER/PROPH/DIAG INJ SC/IM: CPT

## 2024-07-05 PROCEDURE — 81003 URINALYSIS AUTO W/O SCOPE: CPT

## 2024-07-05 PROCEDURE — 99284 EMERGENCY DEPT VISIT MOD MDM: CPT | Mod: 25

## 2024-07-05 PROCEDURE — 76376 3D RENDER W/INTRP POSTPROCES: CPT | Mod: 26

## 2024-07-05 PROCEDURE — 73522 X-RAY EXAM HIPS BI 3-4 VIEWS: CPT

## 2024-07-05 PROCEDURE — 72192 CT PELVIS W/O DYE: CPT | Mod: MC

## 2024-07-05 RX ORDER — ACETAMINOPHEN 325 MG
650 TABLET ORAL ONCE
Refills: 0 | Status: COMPLETED | OUTPATIENT
Start: 2024-07-05 | End: 2024-07-05

## 2024-07-05 RX ORDER — DIAZEPAM 10 MG/1
2 TABLET ORAL ONCE
Refills: 0 | Status: DISCONTINUED | OUTPATIENT
Start: 2024-07-05 | End: 2024-07-05

## 2024-07-05 RX ORDER — KETOROLAC TROMETHAMINE 30 MG/ML
15 INJECTION, SOLUTION INTRAMUSCULAR ONCE
Refills: 0 | Status: DISCONTINUED | OUTPATIENT
Start: 2024-07-05 | End: 2024-07-05

## 2024-07-05 RX ORDER — LIDOCAINE HCL 28 MG/G
1 GEL TOPICAL ONCE
Refills: 0 | Status: COMPLETED | OUTPATIENT
Start: 2024-07-05 | End: 2024-07-05

## 2024-07-05 RX ADMIN — Medication 650 MILLIGRAM(S): at 11:44

## 2024-07-05 RX ADMIN — LIDOCAINE HCL 1 PATCH: 28 GEL TOPICAL at 10:44

## 2024-07-05 RX ADMIN — KETOROLAC TROMETHAMINE 15 MILLIGRAM(S): 30 INJECTION, SOLUTION INTRAMUSCULAR at 11:44

## 2024-07-05 RX ADMIN — Medication 650 MILLIGRAM(S): at 10:44

## 2024-07-05 RX ADMIN — KETOROLAC TROMETHAMINE 15 MILLIGRAM(S): 30 INJECTION, SOLUTION INTRAMUSCULAR at 12:04

## 2024-07-05 RX ADMIN — Medication 10 MILLIGRAM(S): at 10:44

## 2024-07-05 RX ADMIN — KETOROLAC TROMETHAMINE 15 MILLIGRAM(S): 30 INJECTION, SOLUTION INTRAMUSCULAR at 10:43

## 2024-07-05 RX ADMIN — DIAZEPAM 2 MILLIGRAM(S): 10 TABLET ORAL at 12:04

## 2024-07-13 ENCOUNTER — EMERGENCY (EMERGENCY)
Facility: HOSPITAL | Age: 79
LOS: 1 days | Discharge: HOME | End: 2024-07-13
Attending: STUDENT IN AN ORGANIZED HEALTH CARE EDUCATION/TRAINING PROGRAM
Payer: MEDICARE

## 2024-07-13 VITALS
DIASTOLIC BLOOD PRESSURE: 82 MMHG | HEIGHT: 64 IN | SYSTOLIC BLOOD PRESSURE: 174 MMHG | RESPIRATION RATE: 16 BRPM | OXYGEN SATURATION: 98 % | HEART RATE: 95 BPM | TEMPERATURE: 98 F | WEIGHT: 169.98 LBS

## 2024-07-13 PROCEDURE — 99284 EMERGENCY DEPT VISIT MOD MDM: CPT

## 2024-07-13 PROCEDURE — 99284 EMERGENCY DEPT VISIT MOD MDM: CPT | Mod: 25

## 2024-07-13 PROCEDURE — 96372 THER/PROPH/DIAG INJ SC/IM: CPT

## 2024-07-13 RX ORDER — MELOXICAM 7.5 MG/1
1 TABLET ORAL
Qty: 14 | Refills: 0
Start: 2024-07-13 | End: 2024-07-26

## 2024-07-13 RX ORDER — DICLOFENAC SODIUM 10 MG/G
2 GEL TOPICAL
Qty: 1 | Refills: 0
Start: 2024-07-13 | End: 2024-07-26

## 2024-07-13 RX ORDER — TRAMADOL HYDROCHLORIDE 50 MG/1
1 TABLET, FILM COATED ORAL
Qty: 28 | Refills: 0
Start: 2024-07-13 | End: 2024-07-19

## 2024-07-13 RX ORDER — KETOROLAC TROMETHAMINE 30 MG/ML
15 INJECTION, SOLUTION INTRAMUSCULAR ONCE
Refills: 0 | Status: DISCONTINUED | OUTPATIENT
Start: 2024-07-13 | End: 2024-07-13

## 2024-07-13 RX ORDER — LIDOCAINE HCL 28 MG/G
1 GEL TOPICAL ONCE
Refills: 0 | Status: COMPLETED | OUTPATIENT
Start: 2024-07-13 | End: 2024-07-13

## 2024-07-13 RX ORDER — MORPHINE SULFATE 100 MG/1
4 TABLET, EXTENDED RELEASE ORAL ONCE
Refills: 0 | Status: DISCONTINUED | OUTPATIENT
Start: 2024-07-13 | End: 2024-07-13

## 2024-07-13 RX ORDER — LIDOCAINE HCL 28 MG/G
1 GEL TOPICAL
Qty: 14 | Refills: 0
Start: 2024-07-13 | End: 2024-07-26

## 2024-07-13 RX ADMIN — KETOROLAC TROMETHAMINE 15 MILLIGRAM(S): 30 INJECTION, SOLUTION INTRAMUSCULAR at 11:12

## 2024-07-13 RX ADMIN — KETOROLAC TROMETHAMINE 15 MILLIGRAM(S): 30 INJECTION, SOLUTION INTRAMUSCULAR at 11:42

## 2024-07-13 RX ADMIN — LIDOCAINE HCL 1 PATCH: 28 GEL TOPICAL at 11:13

## 2024-07-13 RX ADMIN — MORPHINE SULFATE 4 MILLIGRAM(S): 100 TABLET, EXTENDED RELEASE ORAL at 11:42

## 2024-07-13 RX ADMIN — MORPHINE SULFATE 4 MILLIGRAM(S): 100 TABLET, EXTENDED RELEASE ORAL at 11:12

## 2024-07-13 RX ADMIN — LIDOCAINE HCL 1 PATCH: 28 GEL TOPICAL at 12:00

## 2024-09-04 ENCOUNTER — APPOINTMENT (OUTPATIENT)
Dept: ORTHOPEDIC SURGERY | Facility: CLINIC | Age: 79
End: 2024-09-04
Payer: MEDICARE

## 2024-09-04 VITALS
HEART RATE: 57 BPM | SYSTOLIC BLOOD PRESSURE: 130 MMHG | DIASTOLIC BLOOD PRESSURE: 76 MMHG | HEIGHT: 65 IN | BODY MASS INDEX: 28.32 KG/M2 | WEIGHT: 170 LBS

## 2024-09-04 DIAGNOSIS — M48.061 SPINAL STENOSIS, LUMBAR REGION WITHOUT NEUROGENIC CLAUDICATION: ICD-10-CM

## 2024-09-04 PROCEDURE — 99204 OFFICE O/P NEW MOD 45 MIN: CPT

## 2024-09-04 NOTE — REASON FOR VISIT
[Initial Consultation] : an initial consultation for [Back Pain] : back pain [Radiculopathy] : radiculopathy [Family Member] : family member

## 2024-09-05 NOTE — HISTORY OF PRESENT ILLNESS
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  Patient states that his best days are when working and being productive.  Patient had complained of back pain radiating down into the left thigh to his local orthopedist who had recommended and prescribed for an MRI of the lumbar spine that would need to be followed up with a orthopedic spine specialist.  Patient's pain fluctuates between a 2 to a 6/10 with no medications on board burning sensation into the left inner thigh radiating into the left knee.  Patient states that he has no buckling of the left knee. Patient has been prescribed meloxicam and Solu-Medrol medications with no relief of pain. [Acetaminophen] : not relieved by acetaminophen [NSAIDs] : not relieved by nonsteroidal anti-inflammatory drugs

## 2024-09-05 NOTE — HISTORY OF PRESENT ILLNESS
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  Patient states that his best days are when working and being productive.  Patient had complained of back pain radiating down into the left thigh to his local orthopedist who had recommended and prescribed for an MRI of the lumbar spine that would need to be followed up with a orthopedic spine specialist.  Patient's pain fluctuates between a 2 to a 6/10 with no medications on board burning sensation into the left inner thigh radiating into the left knee.  Patient states that he has no buckling of the left knee. Patient has been prescribed meloxicam and Solu-Medrol medications with no relief of pain. [Acetaminophen] : not relieved by acetaminophen [NSAIDs] : not relieved by nonsteroidal anti-inflammatory drugs

## 2024-09-05 NOTE — PHYSICAL EXAM
[Wide-Based] : wide-based [LE] : 5/5 motor strength in bilateral lower extremities [Knee] : patellar 2+ and symmetric bilaterally [Ankle] : ankle 2+ and symmetric bilaterally [Normal RLE] : Right Lower Extremity: No scars, rashes, lesions, ulcers, skin intact [Normal LLE] : Left Lower Extremity: No scars, rashes, lesions, ulcers, skin intact [Normal DTR Reflexes] : DTR reflexes normal [Normal] : No costovertebral angle tenderness and no spinal tenderness [de-identified] : Negative FABERs of bilateral hips. Negative palpable tenderness of the greater trochanteric lateral hips. No noted atrophy noted of bilateral LE musculature. [de-identified] : Heel and toe walk is intact. Tension signs of bilateral LEs are negative. [de-identified] : No new x-rays were taken on today's office visit.  MRI of the lumbar spine done on August 26, 2024, at [facility] Normal signal intensity and morphology. Multilevel type II endplate degenerative changes.  No suspicious marrow signal abnormality. Moderate spinal canal stenosis at L3-4 and L5-S1.  Moderate bilateral foraminal stenosis at L3-4 and L4-5. Severe bilateral foraminal stenosis at L4-5.

## 2024-09-05 NOTE — DISCUSSION/SUMMARY
[Medication Risks Reviewed] : Medication risks reviewed [Surgical risks reviewed] : Surgical risks reviewed [6 Weeks] : in 6 weeks [de-identified] : Reviewed choices of physical therapy, pain management with epidural steroid injections and to follow with physical therapy, if conservative treatment does not help alleviate some of his left leg symptoms surgery of a lumbar laminectomy with possible fusion. Provided patient with pain management information to try and start with an epidural steroid injection to subside the inflammation and start physical therapy after he comes in the office to see how the epidural injection worked.  I, Dr. Jameson Mead, personally performed the evaluation and management (E/M) services for this new patient.  That E/M includes conducting the initial examination, assessing all conditions, and establishing a plan of care.  Today, my ACP, Carmel Thomas, was here to observe my evaluation and management services for this patient to be followed going forward.

## 2024-09-05 NOTE — DISCUSSION/SUMMARY
[Medication Risks Reviewed] : Medication risks reviewed [Surgical risks reviewed] : Surgical risks reviewed [6 Weeks] : in 6 weeks [de-identified] : Reviewed choices of physical therapy, pain management with epidural steroid injections and to follow with physical therapy, if conservative treatment does not help alleviate some of his left leg symptoms surgery of a lumbar laminectomy with possible fusion. Provided patient with pain management information to try and start with an epidural steroid injection to subside the inflammation and start physical therapy after he comes in the office to see how the epidural injection worked.  I, Dr. Jameson Mead, personally performed the evaluation and management (E/M) services for this new patient.  That E/M includes conducting the initial examination, assessing all conditions, and establishing a plan of care.  Today, my ACP, Carmel Thomas, was here to observe my evaluation and management services for this patient to be followed going forward.

## 2024-09-05 NOTE — PHYSICAL EXAM
[Wide-Based] : wide-based [LE] : 5/5 motor strength in bilateral lower extremities [Knee] : patellar 2+ and symmetric bilaterally [Ankle] : ankle 2+ and symmetric bilaterally [Normal RLE] : Right Lower Extremity: No scars, rashes, lesions, ulcers, skin intact [Normal LLE] : Left Lower Extremity: No scars, rashes, lesions, ulcers, skin intact [Normal DTR Reflexes] : DTR reflexes normal [Normal] : No costovertebral angle tenderness and no spinal tenderness [de-identified] : Negative FABERs of bilateral hips. Negative palpable tenderness of the greater trochanteric lateral hips. No noted atrophy noted of bilateral LE musculature. [de-identified] : Heel and toe walk is intact. Tension signs of bilateral LEs are negative. [de-identified] : No new x-rays were taken on today's office visit.  MRI of the lumbar spine done on August 26, 2024, at [facility] Normal signal intensity and morphology. Multilevel type II endplate degenerative changes.  No suspicious marrow signal abnormality. Moderate spinal canal stenosis at L3-4 and L5-S1.  Moderate bilateral foraminal stenosis at L3-4 and L4-5. Severe bilateral foraminal stenosis at L4-5.

## 2024-11-13 ENCOUNTER — APPOINTMENT (OUTPATIENT)
Dept: ORTHOPEDIC SURGERY | Facility: CLINIC | Age: 79
End: 2024-11-13
Payer: MEDICARE

## 2024-11-13 PROCEDURE — 99214 OFFICE O/P EST MOD 30 MIN: CPT

## 2024-11-18 ENCOUNTER — APPOINTMENT (OUTPATIENT)
Dept: ORTHOPEDIC SURGERY | Facility: CLINIC | Age: 79
End: 2024-11-18

## 2024-11-20 ENCOUNTER — APPOINTMENT (OUTPATIENT)
Dept: ORTHOPEDIC SURGERY | Facility: CLINIC | Age: 79
End: 2024-11-20
Payer: MEDICARE

## 2024-11-20 DIAGNOSIS — M48.061 SPINAL STENOSIS, LUMBAR REGION WITHOUT NEUROGENIC CLAUDICATION: ICD-10-CM

## 2024-11-20 DIAGNOSIS — M17.12 UNILATERAL PRIMARY OSTEOARTHRITIS, LEFT KNEE: ICD-10-CM

## 2024-11-20 PROCEDURE — 20610 DRAIN/INJ JOINT/BURSA W/O US: CPT | Mod: LT

## 2024-11-20 PROCEDURE — 99214 OFFICE O/P EST MOD 30 MIN: CPT | Mod: 25

## 2024-11-20 RX ORDER — METOPROLOL SUCCINATE 25 MG/1
25 TABLET, EXTENDED RELEASE ORAL
Qty: 90 | Refills: 0 | Status: ACTIVE | COMMUNITY
Start: 2024-11-16

## 2024-11-20 RX ADMIN — METHYLPREDNISOLONE ACETATE 2 MG/ML: 40 INJECTION, SUSPENSION INTRA-ARTICULAR; INTRALESIONAL; INTRAMUSCULAR; SOFT TISSUE at 00:00

## 2024-11-20 RX ADMIN — LIDOCAINE HYDROCHLORIDE 3 %: 10 INJECTION, SOLUTION INFILTRATION; PERINEURAL at 00:00

## 2024-11-21 RX ORDER — LIDOCAINE HYDROCHLORIDE 10 MG/ML
1 INJECTION, SOLUTION INFILTRATION; PERINEURAL
Refills: 0 | Status: COMPLETED | OUTPATIENT
Start: 2024-11-20

## 2024-11-21 RX ORDER — METHYLPRED ACET/NACL,ISO-OS/PF 40 MG/ML
40 VIAL (ML) INJECTION
Refills: 0 | Status: COMPLETED | OUTPATIENT
Start: 2024-11-20

## 2024-12-12 ENCOUNTER — APPOINTMENT (OUTPATIENT)
Dept: ORTHOPEDIC SURGERY | Facility: CLINIC | Age: 79
End: 2024-12-12

## 2025-01-21 ENCOUNTER — APPOINTMENT (OUTPATIENT)
Dept: ORTHOPEDIC SURGERY | Facility: CLINIC | Age: 80
End: 2025-01-21
Payer: MEDICARE

## 2025-01-21 VITALS — WEIGHT: 170 LBS | HEIGHT: 65 IN | BODY MASS INDEX: 28.32 KG/M2

## 2025-01-21 DIAGNOSIS — M16.12 UNILATERAL PRIMARY OSTEOARTHRITIS, LEFT HIP: ICD-10-CM

## 2025-01-21 PROCEDURE — 99214 OFFICE O/P EST MOD 30 MIN: CPT

## 2025-01-21 PROCEDURE — 73562 X-RAY EXAM OF KNEE 3: CPT | Mod: LT

## 2025-02-11 NOTE — H&P ADULT - HEIGHT IN CM
Returned call  Left message for patient at    Telephone Information:   Mobile [phone]    to schedule procedure.  Patient to return call to Griselda BOWEN [phone].   160.02

## 2025-03-04 ENCOUNTER — OUTPATIENT (OUTPATIENT)
Dept: OUTPATIENT SERVICES | Facility: HOSPITAL | Age: 80
LOS: 1 days | End: 2025-03-04
Payer: MEDICARE

## 2025-03-04 ENCOUNTER — APPOINTMENT (OUTPATIENT)
Dept: ULTRASOUND IMAGING | Facility: CLINIC | Age: 80
End: 2025-03-04
Payer: MEDICARE

## 2025-03-04 DIAGNOSIS — M16.12 UNILATERAL PRIMARY OSTEOARTHRITIS, LEFT HIP: ICD-10-CM

## 2025-03-04 PROCEDURE — 76942 ECHO GUIDE FOR BIOPSY: CPT

## 2025-03-04 PROCEDURE — 76942 ECHO GUIDE FOR BIOPSY: CPT | Mod: 26

## 2025-07-30 ENCOUNTER — APPOINTMENT (OUTPATIENT)
Dept: ORTHOPEDIC SURGERY | Facility: CLINIC | Age: 80
End: 2025-07-30
Payer: MEDICARE

## 2025-07-30 DIAGNOSIS — Z87.39 PERSONAL HISTORY OF OTHER DISEASES OF THE MUSCULOSKELETAL SYSTEM AND CONNECTIVE TISSUE: ICD-10-CM

## 2025-07-30 PROCEDURE — 99214 OFFICE O/P EST MOD 30 MIN: CPT

## 2025-07-30 RX ORDER — DICLOFENAC SODIUM 50 MG/1
50 TABLET, DELAYED RELEASE ORAL
Qty: 30 | Refills: 3 | Status: ACTIVE | COMMUNITY
Start: 2025-07-30 | End: 1900-01-01

## 2025-08-13 ENCOUNTER — APPOINTMENT (OUTPATIENT)
Dept: ORTHOPEDIC SURGERY | Facility: CLINIC | Age: 80
End: 2025-08-13
Payer: MEDICARE

## 2025-08-13 DIAGNOSIS — M47.812 SPONDYLOSIS W/OUT MYELOPATHY OR RADICULOPATHY, CERVICAL REGION: ICD-10-CM

## 2025-08-13 PROCEDURE — 99214 OFFICE O/P EST MOD 30 MIN: CPT
